# Patient Record
Sex: MALE
[De-identification: names, ages, dates, MRNs, and addresses within clinical notes are randomized per-mention and may not be internally consistent; named-entity substitution may affect disease eponyms.]

---

## 2019-06-21 ENCOUNTER — TRANSCRIPTION ENCOUNTER (OUTPATIENT)
Age: 44
End: 2019-06-21

## 2021-06-16 ENCOUNTER — TRANSCRIPTION ENCOUNTER (OUTPATIENT)
Age: 46
End: 2021-06-16

## 2022-10-05 PROBLEM — Z00.00 ENCOUNTER FOR PREVENTIVE HEALTH EXAMINATION: Status: ACTIVE | Noted: 2022-10-05

## 2022-10-07 ENCOUNTER — APPOINTMENT (OUTPATIENT)
Dept: COLORECTAL SURGERY | Facility: CLINIC | Age: 47
End: 2022-10-07

## 2022-10-07 VITALS
WEIGHT: 230 LBS | SYSTOLIC BLOOD PRESSURE: 137 MMHG | HEIGHT: 70 IN | TEMPERATURE: 96 F | DIASTOLIC BLOOD PRESSURE: 29 MMHG | RESPIRATION RATE: 16 BRPM | HEART RATE: 81 BPM | BODY MASS INDEX: 32.93 KG/M2

## 2022-10-07 DIAGNOSIS — K62.5 HEMORRHAGE OF ANUS AND RECTUM: ICD-10-CM

## 2022-10-07 DIAGNOSIS — R19.7 DIARRHEA, UNSPECIFIED: ICD-10-CM

## 2022-10-07 DIAGNOSIS — K60.1 CHRONIC ANAL FISSURE: ICD-10-CM

## 2022-10-07 PROCEDURE — 99203 OFFICE O/P NEW LOW 30 MIN: CPT

## 2022-10-07 NOTE — HISTORY OF PRESENT ILLNESS
[FreeTextEntry1] : 46yoM with PMH HTN and hypercholesterolemia on medications who presents with a chief complaint of rectal bleeding and perianal pain for the last several months.  Bleeding is with BMs and pain is primarily after/around BMs.  No fever/chills/drainage other than blood.  Endorses external lump.  He typically has diarrhea.  \par \par Family history noncontributory.  He did have a colonoscopy 3-4 years ago which he reports was positive for polyps, and was told to have repeat scope done in 3 years so he is due.  The report is not available to me at this time.  Colonoscopy was performed for bleeding.

## 2022-10-07 NOTE — PHYSICAL EXAM
[Respiratory Effort] : normal respiratory effort [Normal Rate and Rhythm] : normal rate and rhythm [No Edema] : No edema [No Rash or Lesion] : No rash or lesion [Alert] : alert [Oriented to Person] : oriented to person [Oriented to Place] : oriented to place [Oriented to Time] : oriented to time [Calm] : calm [JVD] : no jugular venous distention  [de-identified] : Soft, nondistended [de-identified] : Posterior midline broad-based fissure with rolled edges and sentinel tag.  No fistula, excoriations, condyloma.  Tenderness when probed with cotton-tipped applicator.  TIFFANIE and anoscopy deferred for now given pain. [de-identified] : NAD [de-identified] : Normocephalic [de-identified] : Moves all extremities without issues